# Patient Record
Sex: MALE | Race: WHITE | NOT HISPANIC OR LATINO | URBAN - METROPOLITAN AREA
[De-identification: names, ages, dates, MRNs, and addresses within clinical notes are randomized per-mention and may not be internally consistent; named-entity substitution may affect disease eponyms.]

---

## 2019-01-25 ENCOUNTER — OUTPATIENT (OUTPATIENT)
Dept: OUTPATIENT SERVICES | Facility: HOSPITAL | Age: 69
LOS: 1 days | Discharge: HOME | End: 2019-01-25

## 2021-08-11 ENCOUNTER — FORM ENCOUNTER (OUTPATIENT)
Age: 71
End: 2021-08-11

## 2022-04-25 ENCOUNTER — NON-APPOINTMENT (OUTPATIENT)
Age: 72
End: 2022-04-25

## 2022-04-25 DIAGNOSIS — R35.1 NOCTURIA: ICD-10-CM

## 2022-04-25 DIAGNOSIS — Z87.891 PERSONAL HISTORY OF NICOTINE DEPENDENCE: ICD-10-CM

## 2022-04-25 DIAGNOSIS — Z96.0 PRESENCE OF UROGENITAL IMPLANTS: ICD-10-CM

## 2022-04-25 DIAGNOSIS — Z82.3 FAMILY HISTORY OF STROKE: ICD-10-CM

## 2022-04-25 DIAGNOSIS — Z72.89 OTHER PROBLEMS RELATED TO LIFESTYLE: ICD-10-CM

## 2022-04-25 PROBLEM — Z00.00 ENCOUNTER FOR PREVENTIVE HEALTH EXAMINATION: Status: ACTIVE | Noted: 2022-04-25

## 2022-04-25 RX ORDER — ASPIRIN 81 MG
81 TABLET, DELAYED RELEASE (ENTERIC COATED) ORAL
Refills: 0 | Status: ACTIVE | COMMUNITY

## 2022-04-25 RX ORDER — TESTOSTERONE 30 MG/1.5ML
SOLUTION TOPICAL
Refills: 0 | Status: ACTIVE | COMMUNITY

## 2022-04-25 RX ORDER — RAMIPRIL 5 MG/1
5 CAPSULE ORAL
Refills: 0 | Status: ACTIVE | COMMUNITY

## 2022-04-25 RX ORDER — PSYLLIUM SEED (WITH DEXTROSE)
28.3 POWDER (GRAM) ORAL
Refills: 0 | Status: ACTIVE | COMMUNITY

## 2022-08-18 ENCOUNTER — APPOINTMENT (OUTPATIENT)
Dept: UROLOGY | Facility: CLINIC | Age: 72
End: 2022-08-18

## 2022-08-18 VITALS
HEIGHT: 68 IN | WEIGHT: 178 LBS | DIASTOLIC BLOOD PRESSURE: 72 MMHG | BODY MASS INDEX: 26.98 KG/M2 | TEMPERATURE: 97.4 F | SYSTOLIC BLOOD PRESSURE: 118 MMHG

## 2022-08-18 PROCEDURE — 99214 OFFICE O/P EST MOD 30 MIN: CPT | Mod: 25

## 2022-08-18 PROCEDURE — 51741 ELECTRO-UROFLOWMETRY FIRST: CPT

## 2022-08-18 PROCEDURE — 51798 US URINE CAPACITY MEASURE: CPT | Mod: 59

## 2022-08-18 RX ORDER — ROSUVASTATIN CALCIUM 20 MG/1
20 TABLET, FILM COATED ORAL
Refills: 0 | Status: ACTIVE | COMMUNITY

## 2022-08-18 NOTE — ASSESSMENT
[FreeTextEntry1] : BLADDER SCAN:\par Indication: Increased frequency of urination. \par Initial Volume: 180   cc\par PVR: 70  cc\par Results: Stress urinary incontinence. \par Recommendations: No Therapy Needed.\par \par \par URO FLOWMETRY:\par Indication: Increased frequency of urination. \par Urinary Flow Rate: 4.7   m/s\par Results: Stress urinary incontinence    \par Recommendations: No therapy needed.\par

## 2022-08-18 NOTE — HISTORY OF PRESENT ILLNESS
[FreeTextEntry1] : The patient is 3 years status post insertion of penile implant he is very satisfied.  He uses the implant twice a week.

## 2023-08-17 ENCOUNTER — APPOINTMENT (OUTPATIENT)
Dept: UROLOGY | Facility: CLINIC | Age: 73
End: 2023-08-17
Payer: MEDICARE

## 2023-08-17 VITALS
WEIGHT: 175 LBS | BODY MASS INDEX: 26.52 KG/M2 | SYSTOLIC BLOOD PRESSURE: 110 MMHG | DIASTOLIC BLOOD PRESSURE: 70 MMHG | HEIGHT: 68 IN | TEMPERATURE: 98.1 F

## 2023-08-17 DIAGNOSIS — Z85.46 PERSONAL HISTORY OF MALIGNANT NEOPLASM OF PROSTATE: ICD-10-CM

## 2023-08-17 DIAGNOSIS — Z96.89 PRESENCE OF OTHER SPECIFIED FUNCTIONAL IMPLANTS: ICD-10-CM

## 2023-08-17 DIAGNOSIS — N52.9 MALE ERECTILE DYSFUNCTION, UNSPECIFIED: ICD-10-CM

## 2023-08-17 DIAGNOSIS — R35.0 FREQUENCY OF MICTURITION: ICD-10-CM

## 2023-08-17 DIAGNOSIS — N48.89 OTHER SPECIFIED DISORDERS OF PENIS: ICD-10-CM

## 2023-08-17 DIAGNOSIS — N48.6 INDURATION PENIS PLASTICA: ICD-10-CM

## 2023-08-17 DIAGNOSIS — N52.31 ERECTILE DYSFUNCTION FOLLOWING RADICAL PROSTATECTOMY: ICD-10-CM

## 2023-08-17 PROCEDURE — 51798 US URINE CAPACITY MEASURE: CPT | Mod: 59

## 2023-08-17 PROCEDURE — 51741 ELECTRO-UROFLOWMETRY FIRST: CPT

## 2023-08-17 PROCEDURE — 99213 OFFICE O/P EST LOW 20 MIN: CPT | Mod: 25

## 2023-08-17 NOTE — HISTORY OF PRESENT ILLNESS
[FreeTextEntry1] : 73M  s/p IPP 1/25/2019 (coloplast touch) returns for FU no issues happy with the implant getting PSA checked by primary doctor in [facility]

## 2023-08-17 NOTE — ASSESSMENT
[FreeTextEntry1] : BLADDER SCAN: Indication: Increased frequency of urination. Initial Volume: 333 ml PVR: 31   ml Results: Urinary retention Recommendations: No Therapy Needed  URO FLOWMETRY: Indication: Increased frequency of urination. Urinary Flow Rate:  12.5 ml/s Results: Urinary retention Recommendations: No therapy needed..  A/P: 73M s/p IPP in 01/2019 - working well - urinating well - happy with implant - getting PSA checked by primary doc - RTC 1 year

## 2023-08-17 NOTE — PHYSICAL EXAM
[General Appearance - Well Developed] : well developed [General Appearance - Well Nourished] : well nourished [Normal Appearance] : normal appearance [Well Groomed] : well groomed [General Appearance - In No Acute Distress] : no acute distress [Abdomen Soft] : soft [Abdomen Tenderness] : non-tender [Costovertebral Angle Tenderness] : no ~M costovertebral angle tenderness [Urethral Meatus] : meatus normal [Urinary Bladder Findings] : the bladder was normal on palpation [Scrotum] : the scrotum was normal [Testes Mass (___cm)] : there were no testicular masses [No Prostate Nodules] : no prostate nodules [FreeTextEntry1] : Penile implant examination:  The overall appearance of the penis and scrotum is excellent.   There is minimal edema and swelling.  There is no bruising bruising noted.  No erythema.   The incision is clean and dry.  The skin edges are well approximated.   The cylinders of the implant are appropriately sized with the cylinder tip well-placed in the mid glans.   Scrotal skin is intact.  The location of the pump is good.   It is concealed yet easily accessible.  There is no tethering of the pump to the skin.  The reservoir is well-positioned and nonpalpable.

## 2024-08-16 NOTE — HISTORY OF PRESENT ILLNESS
[FreeTextEntry1] : DRAFT  74M w/ ED for annual f/u s/p IPP 1/25/19  ??? gets PSA checked annually by PCP in [place] labs refills LUTS?

## 2024-08-16 NOTE — PLAN
[TextEntry] : A/P, 74M w/ ED for annual f/u - s/p [facility] 1/25/19 -  - f/u ___________

## 2024-08-16 NOTE — PLAN
[TextEntry] : A/P, 74M w/ ED for annual f/u - s/p [facility] 1/25/19 -  - f/u ___________

## 2024-08-16 NOTE — PLAN
[TextEntry] : A/P, 74M w/ ED for annual f/u - s/p [facility] 1/25/19 -  - f/u ___________

## 2024-08-27 NOTE — PHYSICAL EXAM
[General Appearance - Well Developed] : well developed [General Appearance - Well Nourished] : well nourished [Heart Rate And Rhythm] : heart rate and rhythm were normal [] : no respiratory distress [Respiration, Rhythm And Depth] : normal respiratory rhythm and effort [Bowel Sounds] : normal bowel sounds [Abdomen Soft] : soft [Chaperone Present] : A chaperone was present in the examining room during all aspects of the physical examination [FreeTextEntry2] : Rj Malloy NP [TextEntry] : The patient is status post insertion of inflatable multicomponent inflatable penile prosthesis in the past. The overall appearance is excellent there is no edema, swelling or erythema. The incision is well-healed the edges are well approximated there is no discharge. Cylinders sizing is excellent.  The distal tips are well positioned into the mid glans. The appearance of the cylinders deflated and the shaft of the penis flaccid is also good. Scrotal skin is intact the location of the pump is excellent it is in in in the back of the scrotum, well concealed, yet accessible to manipulation. There is no tethering of the pump. The reservoir in the lower quadrant of the abdomen is nonpalpable.

## 2024-08-27 NOTE — PLAN
[TextEntry] : A/P, 74M w/ ED for annual f/u - s/p [facility] 1/25/19 - - f/u ___________

## 2024-08-27 NOTE — HISTORY OF PRESENT ILLNESS
[FreeTextEntry1] : DRAFT  74M w/ ED for annual f/u s/p IPP 1/25/19  FROM 2023 APPT: gets PSA checked annually by PCP in [place] labs refills LUTS?

## 2024-08-27 NOTE — PLAN
[TextEntry] : A/P, 74M w/ ED for annual f/u - s/p [facility] 1/25/19 - - f/u ___________

## 2024-08-29 ENCOUNTER — APPOINTMENT (OUTPATIENT)
Dept: UROLOGY | Facility: CLINIC | Age: 74
End: 2024-08-29

## 2024-09-04 NOTE — PHYSICAL EXAM
[General Appearance - Well Developed] : well developed [General Appearance - Well Nourished] : well nourished [Heart Rate And Rhythm] : heart rate and rhythm were normal [] : no respiratory distress [Respiration, Rhythm And Depth] : normal respiratory rhythm and effort [Bowel Sounds] : normal bowel sounds [Abdomen Soft] : soft [Chaperone Present] : A chaperone was present in the examining room during all aspects of the physical examination [TextEntry] : The patient is status post insertion of inflatable multicomponent inflatable penile prosthesis in the past. The overall appearance is excellent there is no edema, swelling or erythema. The incision is well-healed the edges are well approximated there is no discharge. Cylinders sizing is excellent.  The distal tips are well positioned into the mid glans. The appearance of the cylinders deflated and the shaft of the penis flaccid is also good. Scrotal skin is intact the location of the pump is excellent it is in in in the back of the scrotum, well concealed, yet accessible to manipulation. There is no tethering of the pump. The reservoir in the lower quadrant of the abdomen is nonpalpable. [FreeTextEntry2] : Rj Malloy NP

## 2024-09-04 NOTE — PHYSICAL EXAM
[General Appearance - Well Developed] : well developed [General Appearance - Well Nourished] : well nourished [Heart Rate And Rhythm] : heart rate and rhythm were normal [] : no respiratory distress [Bowel Sounds] : normal bowel sounds [Respiration, Rhythm And Depth] : normal respiratory rhythm and effort [Abdomen Soft] : soft [Chaperone Present] : A chaperone was present in the examining room during all aspects of the physical examination [TextEntry] : The patient is status post insertion of inflatable multicomponent inflatable penile prosthesis in the past. The overall appearance is excellent there is no edema, swelling or erythema. The incision is well-healed the edges are well approximated there is no discharge. Cylinders sizing is excellent.  The distal tips are well positioned into the mid glans. The appearance of the cylinders deflated and the shaft of the penis flaccid is also good. Scrotal skin is intact the location of the pump is excellent it is in in in the back of the scrotum, well concealed, yet accessible to manipulation. There is no tethering of the pump. The reservoir in the lower quadrant of the abdomen is nonpalpable. [FreeTextEntry2] : Rj Malloy NP

## 2024-09-05 ENCOUNTER — APPOINTMENT (OUTPATIENT)
Dept: UROLOGY | Facility: CLINIC | Age: 74
End: 2024-09-05

## 2024-10-24 ENCOUNTER — APPOINTMENT (OUTPATIENT)
Dept: UROLOGY | Facility: CLINIC | Age: 74
End: 2024-10-24
Payer: MEDICARE

## 2024-10-24 ENCOUNTER — NON-APPOINTMENT (OUTPATIENT)
Age: 74
End: 2024-10-24

## 2024-10-24 VITALS
SYSTOLIC BLOOD PRESSURE: 120 MMHG | DIASTOLIC BLOOD PRESSURE: 80 MMHG | WEIGHT: 175 LBS | HEIGHT: 68 IN | BODY MASS INDEX: 26.52 KG/M2 | TEMPERATURE: 97.3 F

## 2024-10-24 DIAGNOSIS — Z00.00 ENCOUNTER FOR GENERAL ADULT MEDICAL EXAMINATION W/OUT ABNORMAL FINDINGS: ICD-10-CM

## 2024-10-24 DIAGNOSIS — Z96.0 PRESENCE OF UROGENITAL IMPLANTS: ICD-10-CM

## 2024-10-24 DIAGNOSIS — Z96.89 PRESENCE OF OTHER SPECIFIED FUNCTIONAL IMPLANTS: ICD-10-CM

## 2024-10-24 DIAGNOSIS — N52.9 MALE ERECTILE DYSFUNCTION, UNSPECIFIED: ICD-10-CM

## 2024-10-24 DIAGNOSIS — N48.6 INDURATION PENIS PLASTICA: ICD-10-CM

## 2024-10-24 DIAGNOSIS — N52.31 ERECTILE DYSFUNCTION FOLLOWING RADICAL PROSTATECTOMY: ICD-10-CM

## 2024-10-24 PROCEDURE — 99214 OFFICE O/P EST MOD 30 MIN: CPT

## 2025-07-30 PROBLEM — Z12.5 SCREENING PSA (PROSTATE SPECIFIC ANTIGEN): Status: ACTIVE | Noted: 2025-07-30

## 2025-07-30 PROBLEM — E29.1 HYPOGONADISM, MALE: Status: ACTIVE | Noted: 2025-07-30

## 2025-07-30 PROBLEM — Z79.890 LONG-TERM CURRENT USE OF TESTOSTERONE REPLACEMENT THERAPY: Status: ACTIVE | Noted: 2025-07-30

## 2025-07-31 ENCOUNTER — APPOINTMENT (OUTPATIENT)
Dept: UROLOGY | Facility: CLINIC | Age: 75
End: 2025-07-31
Payer: MEDICARE

## 2025-07-31 ENCOUNTER — NON-APPOINTMENT (OUTPATIENT)
Age: 75
End: 2025-07-31

## 2025-07-31 VITALS
OXYGEN SATURATION: 99 % | BODY MASS INDEX: 26.52 KG/M2 | DIASTOLIC BLOOD PRESSURE: 80 MMHG | WEIGHT: 175 LBS | HEART RATE: 54 BPM | TEMPERATURE: 98 F | SYSTOLIC BLOOD PRESSURE: 138 MMHG | HEIGHT: 68 IN

## 2025-07-31 DIAGNOSIS — N52.9 MALE ERECTILE DYSFUNCTION, UNSPECIFIED: ICD-10-CM

## 2025-07-31 DIAGNOSIS — Z12.5 ENCOUNTER FOR SCREENING FOR MALIGNANT NEOPLASM OF PROSTATE: ICD-10-CM

## 2025-07-31 DIAGNOSIS — N48.6 INDURATION PENIS PLASTICA: ICD-10-CM

## 2025-07-31 DIAGNOSIS — E29.1 TESTICULAR HYPOFUNCTION: ICD-10-CM

## 2025-07-31 DIAGNOSIS — Z85.46 PERSONAL HISTORY OF MALIGNANT NEOPLASM OF PROSTATE: ICD-10-CM

## 2025-07-31 DIAGNOSIS — Z96.89 PRESENCE OF OTHER SPECIFIED FUNCTIONAL IMPLANTS: ICD-10-CM

## 2025-07-31 DIAGNOSIS — Z79.890 HORMONE REPLACEMENT THERAPY: ICD-10-CM

## 2025-07-31 DIAGNOSIS — N48.89 OTHER SPECIFIED DISORDERS OF PENIS: ICD-10-CM

## 2025-07-31 DIAGNOSIS — R35.0 FREQUENCY OF MICTURITION: ICD-10-CM

## 2025-07-31 PROCEDURE — 99213 OFFICE O/P EST LOW 20 MIN: CPT
